# Patient Record
Sex: FEMALE | Race: WHITE | NOT HISPANIC OR LATINO | Employment: PART TIME | URBAN - METROPOLITAN AREA
[De-identification: names, ages, dates, MRNs, and addresses within clinical notes are randomized per-mention and may not be internally consistent; named-entity substitution may affect disease eponyms.]

---

## 2024-02-25 NOTE — PROGRESS NOTES
Subjective    Patient ID: Bharti Moran is a 73 y.o. female.    Chief Complaint: No chief complaint on file.     Last Surgery: No surgery found  Last Surgery Date: No surgery found    Bharti is a 73 y.o. right hand dominant female presenting today for evaluation of their right shoulder.     She explains that she is an avid exerciser, she used to do spin class 3 times a week. She began having back issues and went to her chiropractor who recommended she have an MRI completed of her spine. Her chiropractor advised she stopped spin class and yoga until her back issues are cleared up. She started lifting free weights and then she developed right shoulder pain in December 2023. It does not wake her up but it does cause her difficulty getting up. She uses Biofreeze to help with the pain. She takes no other medications     She is on Crestor and Zetia for heart disease. She does understand that statins do cause some musculoskeletal pain.     Past medical history, surgical history, social history, and family history were all reviewed and are as per the [facility] patient health history questionnaire form that I signed and scanned into the chart today.           Objective   Ortho Exam  Patient is a well-developed, well-nourished female in no acute distress.  Breathes with normal chest rises.  Pupils are round and symmetric today.  Awake, alert, and oriented x3.      Examination of the left shoulder today reveals the skin to be intact. There is no sign of any atrophy, lesions, or abrasions. There is no pain to palpation of the bony prominences. Cervical lymphadenopathy examined, and this was negative. Patient had 5 out of 5 wrist flexion, extension, and thumb extension bilaterally. Sensation was intact to light touch to median, ulnar, radial axillary, and musculocutaneous nerves bilaterally. Positive radial pulse bilaterally. Provocative maneuvers on the left side today were negative.  Range of motion of the left shoulder revealed  0-170° of forward elevation, 0-60° of external rotation, and internal rotation was to T-12.     Examination of the right shoulder today reveals the skin to be intact. There is no sign of any atrophy, lesions, or abrasions. There is no pain to palpation of the bony prominences. Cervical lymphadenopathy examined, and this was negative. Patient had 5 out of 5 wrist flexion, extension, and thumb extension, bilaterally. Sensation was intact to light touch to median, ulnar, radial, axillary, and musculocutaneous nerves bilaterally. Positive radial pulse bilaterally. Mildly positive Hawkin's sign.  Range of motion of the right shoulder revealed 0-170° of forward elevation, 0-60° of external rotation, and internal rotation up to T-12.     Image Results:  02/27/24 MRI personally reviewed by me today demonstrates disc desiccation. Mild hypertrophy and narrowing but nothing concerning    X-rays personally ordered and interpreted by me show no real arthritis, post surgical changes and anchors present from prior surgery.     Patient ID: Bharti Moran is a 73 y.o. female.    L Inj/Asp: L subacromial bursa on 2/27/2024 12:15 PM  Indications: pain  Details: 20 G needle, anterior approach  Outcome: tolerated well, no immediate complications  Procedure, treatment alternatives, risks and benefits explained, specific risks discussed. Consent was given by the patient. Immediately prior to procedure a time out was called to verify the correct patient, procedure, equipment, support staff and site/side marked as required. Patient was prepped and draped in the usual sterile fashion.           Assessment/Plan   Encounter Diagnoses:  No diagnosis found.  Patient with right shoulder pain.     We had a long discussion regarding her diagnosis. We talked about her treatment options. We are going to treat this conservatively at this time. We discussed modifying her activities such as less weight more reps, lower resistance while at spin class.  For her back focusing on more core strengthening will help.  I also provided her with our at home exercises to do daily. If she finds no relief she will let us know and we will obtain repeat imaging.   No orders of the defined types were placed in this encounter.    Follow up as needed.     Scribe Attestation  By signing my name below, I, Elvia Davis   attest that this documentation has been prepared under the direction and in the presence of Alo Rojas MD.

## 2024-02-27 ENCOUNTER — HOSPITAL ENCOUNTER (OUTPATIENT)
Dept: RADIOLOGY | Facility: HOSPITAL | Age: 74
Discharge: HOME | End: 2024-02-27
Payer: MEDICARE

## 2024-02-27 ENCOUNTER — OFFICE VISIT (OUTPATIENT)
Dept: ORTHOPEDIC SURGERY | Facility: HOSPITAL | Age: 74
End: 2024-02-27
Payer: MEDICARE

## 2024-02-27 DIAGNOSIS — M25.511 RIGHT SHOULDER PAIN, UNSPECIFIED CHRONICITY: ICD-10-CM

## 2024-02-27 PROCEDURE — 99213 OFFICE O/P EST LOW 20 MIN: CPT | Performed by: ORTHOPAEDIC SURGERY

## 2024-02-27 PROCEDURE — 73030 X-RAY EXAM OF SHOULDER: CPT | Mod: RIGHT SIDE | Performed by: RADIOLOGY

## 2024-02-27 PROCEDURE — 1159F MED LIST DOCD IN RCRD: CPT | Performed by: ORTHOPAEDIC SURGERY

## 2024-02-27 PROCEDURE — 73030 X-RAY EXAM OF SHOULDER: CPT | Mod: RT

## 2024-02-27 PROCEDURE — 20610 DRAIN/INJ JOINT/BURSA W/O US: CPT | Performed by: ORTHOPAEDIC SURGERY

## 2024-02-27 RX ORDER — EZETIMIBE 10 MG/1
TABLET ORAL
COMMUNITY
Start: 2023-12-11

## 2024-02-27 RX ORDER — ROSUVASTATIN CALCIUM 40 MG/1
TABLET, COATED ORAL
COMMUNITY
Start: 2024-02-19

## 2024-02-27 RX ORDER — ASPIRIN 81 MG/1
TABLET ORAL
COMMUNITY
Start: 2016-06-06

## 2024-05-05 NOTE — PROGRESS NOTES
Subjective    Patient ID: Bharti Moran is a 73 y.o. female.    Chief Complaint: No chief complaint on file.     Last Surgery: No surgery found  Last Surgery Date: No surgery found    HPI  Bharti is a 73 y.o. right hand dominant female presenting today for evaluation of their right shoulder.     She explains that she is an avid exerciser, she used to do spin class 3 times a week. She began having back issues and went to her chiropractor who recommended she have an MRI completed of her spine. Her chiropractor advised she stopped spin class and yoga until her back issues are cleared up. She started lifting free weights and then she developed right shoulder pain in December 2023. It does not wake her up but it does cause her difficulty getting up. She uses Biofreeze to help with the pain. She takes no other medications     She is on Crestor and Zetia for heart disease. She does understand that statins do cause some musculoskeletal pain.     05/07/24  Bharti returns to the clinic today for a follow up visit regarding her bilateral shoulders.     She reports that her right shoulder has improved. She continues to do her at-home exercises daily. They are very helpful    Past medical history, surgical history, social history, and family history were all reviewed and are as per the [facility] patient health history questionnaire form that I signed and scanned into the chart today.     Objective   Ortho Exam  Patient is a well-developed, well-nourished female in no acute distress.  Breathes with normal chest rises.  Pupils are round and symmetric today.  Awake, alert, and oriented x3.      Examination of the left shoulder today reveals the skin to be intact. There is no sign of any atrophy, lesions, or abrasions. There is no pain to palpation of the bony prominences. Cervical lymphadenopathy examined, and this was negative. Patient had 5 out of 5 wrist flexion, extension, and thumb extension bilaterally. Sensation was intact to  light touch to median, ulnar, radial axillary, and musculocutaneous nerves bilaterally. Positive radial pulse bilaterally. Provocative maneuvers on the left side today were negative.  Range of motion of the left shoulder revealed 0-170° of forward elevation, 0-60° of external rotation, and internal rotation was to T-12.     Examination of the right shoulder today reveals the skin to be intact. There is no sign of any atrophy, lesions, or abrasions. There is no pain to palpation of the bony prominences. Cervical lymphadenopathy examined, and this was negative. Patient had 5 out of 5 wrist flexion, extension, and thumb extension, bilaterally. Sensation was intact to light touch to median, ulnar, radial, axillary, and musculocutaneous nerves bilaterally. Positive radial pulse bilaterally. Mildly positive Hawkin's sign.  Range of motion of the right shoulder revealed 0-170° of forward elevation, 0-60° of external rotation, and internal rotation up to T-12.     Image Results:  02/27/24 MRI personally reviewed by me today demonstrates disc desiccation. Mild hypertrophy and narrowing but nothing concerning    X-rays personally ordered and interpreted by me show no real arthritis, post surgical changes and anchors present from prior surgery.         Assessment/Plan   Encounter Diagnoses:  No diagnosis found.  Patient with continued right shoulder pain.     She is going to continue with her activity modifications. She will continue with her at-home exercises. She will return if she continues to have increasing pain and finds no pain relief from her exercises.     No orders of the defined types were placed in this encounter.    Follow up as needed    Scribe Attestation  By signing my name below, IShira Scribe   attest that this documentation has been prepared under the direction and in the presence of Alo Rojas MD.

## 2024-05-07 ENCOUNTER — OFFICE VISIT (OUTPATIENT)
Dept: ORTHOPEDIC SURGERY | Facility: HOSPITAL | Age: 74
End: 2024-05-07
Payer: MEDICARE

## 2024-05-07 VITALS — HEIGHT: 64 IN | WEIGHT: 106 LBS | BODY MASS INDEX: 18.1 KG/M2

## 2024-05-07 DIAGNOSIS — M25.511 RIGHT SHOULDER PAIN, UNSPECIFIED CHRONICITY: Primary | ICD-10-CM

## 2024-05-07 PROCEDURE — 99213 OFFICE O/P EST LOW 20 MIN: CPT | Performed by: ORTHOPAEDIC SURGERY

## 2024-05-07 PROCEDURE — 1125F AMNT PAIN NOTED PAIN PRSNT: CPT | Performed by: ORTHOPAEDIC SURGERY

## 2024-05-07 PROCEDURE — 1159F MED LIST DOCD IN RCRD: CPT | Performed by: ORTHOPAEDIC SURGERY

## 2024-05-07 ASSESSMENT — PAIN SCALES - GENERAL: PAINLEVEL_OUTOF10: 1

## 2024-05-07 ASSESSMENT — PAIN - FUNCTIONAL ASSESSMENT: PAIN_FUNCTIONAL_ASSESSMENT: 0-10

## 2024-07-09 ENCOUNTER — OFFICE VISIT (OUTPATIENT)
Dept: ORTHOPEDIC SURGERY | Facility: HOSPITAL | Age: 74
End: 2024-07-09
Payer: MEDICARE

## 2024-07-09 DIAGNOSIS — M25.511 RIGHT SHOULDER PAIN, UNSPECIFIED CHRONICITY: Primary | ICD-10-CM

## 2024-07-09 PROCEDURE — 99213 OFFICE O/P EST LOW 20 MIN: CPT | Performed by: ORTHOPAEDIC SURGERY

## 2024-07-09 PROCEDURE — 2500000004 HC RX 250 GENERAL PHARMACY W/ HCPCS (ALT 636 FOR OP/ED): Performed by: ORTHOPAEDIC SURGERY

## 2024-07-09 PROCEDURE — 2500000005 HC RX 250 GENERAL PHARMACY W/O HCPCS: Performed by: ORTHOPAEDIC SURGERY

## 2024-07-09 PROCEDURE — 20610 DRAIN/INJ JOINT/BURSA W/O US: CPT | Mod: RT | Performed by: ORTHOPAEDIC SURGERY

## 2024-07-09 NOTE — PROGRESS NOTES
Subjective    Patient ID: Bharti Moran is a 73 y.o. female.    Chief Complaint: No chief complaint on file.     Last Surgery: No surgery found  Last Surgery Date: No surgery found    MARISA Hay is a 73 y.o. right hand dominant female presenting today for evaluation of their right shoulder.     She explains that she is an avid exerciser, she used to do spin class 3 times a week. She began having back issues and went to her chiropractor who recommended she have an MRI completed of her spine. Her chiropractor advised she stopped spin class and yoga until her back issues are cleared up. She started lifting free weights and then she developed right shoulder pain in December 2023. It does not wake her up but it does cause her difficulty getting up. She uses Biofreeze to help with the pain. She takes no other medications     She is on Crestor and Zetia for heart disease. She does understand that statins do cause some musculoskeletal pain.     05/07/24  Bharti returns to the clinic today for a follow up visit regarding her bilateral shoulders.     She reports that her right shoulder has improved. She continues to do her at-home exercises daily. They are very helpful    7/09/24   Bharti Moran is a 73 y.o. female returning to the clinic for a follow up visit regarding her right shoulder.    She explains she had a recent DEXA scan that she had questions regarding her injections and steroid use.     Past medical history, surgical history, social history, and family history were all reviewed and are as per the [facility] patient health history questionnaire form that I signed and scanned into the chart today.     Objective   Ortho Exam  Patient is a well-developed, well-nourished female in no acute distress.  Breathes with normal chest rises.  Pupils are round and symmetric today.  Awake, alert, and oriented x3.      Examination of the left shoulder today reveals the skin to be intact. There is no sign of any atrophy, lesions, or  abrasions. There is no pain to palpation of the bony prominences. Cervical lymphadenopathy examined, and this was negative. Patient had 5 out of 5 wrist flexion, extension, and thumb extension bilaterally. Sensation was intact to light touch to median, ulnar, radial axillary, and musculocutaneous nerves bilaterally. Positive radial pulse bilaterally. Provocative maneuvers on the left side today were negative.  Range of motion of the left shoulder revealed 0-170° of forward elevation, 0-60° of external rotation, and internal rotation was to T-12.     Examination of the right shoulder today reveals the skin to be intact. There is no sign of any atrophy, lesions, or abrasions. There is no pain to palpation of the bony prominences. Cervical lymphadenopathy examined, and this was negative. Patient had 5 out of 5 wrist flexion, extension, and thumb extension, bilaterally. Sensation was intact to light touch to median, ulnar, radial, axillary, and musculocutaneous nerves bilaterally. Positive radial pulse bilaterally. Mildly positive Hawkin's sign.  Range of motion of the right shoulder revealed 0-170° of forward elevation, 0-60° of external rotation, and internal rotation up to T-12.     Image Results:  02/27/24 MRI personally reviewed by me today demonstrates disc desiccation. Mild hypertrophy and narrowing but nothing concerning    X-rays personally ordered and interpreted by me show no real arthritis, post surgical changes and anchors present from prior surgery.     Patient ID: Bharti Moran is a 73 y.o. female.    L Inj/Asp: R subacromial bursa on 7/9/2024 1:39 PM  Indications: pain  Details: 20 G needle, anterior approach  Medications: 40 mg triamcinolone acetonide 40 mg/mL; 4 mL lidocaine 10 mg/mL (1 %)  Outcome: tolerated well, no immediate complications  Procedure, treatment alternatives, risks and benefits explained, specific risks discussed. Consent was given by the patient. Immediately prior to procedure a  time out was called to verify the correct patient, procedure, equipment, support staff and site/side marked as required. Patient was prepped and draped in the usual sterile fashion.           Assessment/Plan   Encounter Diagnoses:  No diagnosis found.  Patient with continued right shoulder pain.     We had a discussion regarding her concerns with long term steroid use.   She is going to continue with her activity modifications. She will continue with her at-home exercises. She will return if she continues to have increasing pain and finds no pain relief from her exercises.     No orders of the defined types were placed in this encounter.    Follow up as needed    Scribe Attestation  By signing my name below, Shira HUSTON , Scribcarolyne   attest that this documentation has been prepared under the direction and in the presence of Alo Rojas MD.

## 2024-07-11 RX ORDER — LIDOCAINE HYDROCHLORIDE 10 MG/ML
4 INJECTION INFILTRATION; PERINEURAL
Status: COMPLETED | OUTPATIENT
Start: 2024-07-09 | End: 2024-07-09

## 2024-07-11 RX ORDER — TRIAMCINOLONE ACETONIDE 40 MG/ML
40 INJECTION, SUSPENSION INTRA-ARTICULAR; INTRAMUSCULAR
Status: COMPLETED | OUTPATIENT
Start: 2024-07-09 | End: 2024-07-09

## 2024-10-22 ENCOUNTER — HOSPITAL ENCOUNTER (OUTPATIENT)
Dept: RADIOLOGY | Facility: HOSPITAL | Age: 74
Discharge: HOME | End: 2024-10-22
Payer: MEDICARE

## 2024-10-22 ENCOUNTER — OFFICE VISIT (OUTPATIENT)
Dept: ORTHOPEDIC SURGERY | Facility: HOSPITAL | Age: 74
End: 2024-10-22
Payer: MEDICARE

## 2024-10-22 ENCOUNTER — APPOINTMENT (OUTPATIENT)
Dept: ORTHOPEDIC SURGERY | Facility: HOSPITAL | Age: 74
End: 2024-10-22
Payer: MEDICARE

## 2024-10-22 DIAGNOSIS — M25.519 SHOULDER PAIN, UNSPECIFIED CHRONICITY, UNSPECIFIED LATERALITY: Primary | ICD-10-CM

## 2024-10-22 DIAGNOSIS — M25.519 SHOULDER PAIN, UNSPECIFIED CHRONICITY, UNSPECIFIED LATERALITY: ICD-10-CM

## 2024-10-22 PROCEDURE — 99213 OFFICE O/P EST LOW 20 MIN: CPT | Performed by: ORTHOPAEDIC SURGERY

## 2024-10-22 PROCEDURE — 20610 DRAIN/INJ JOINT/BURSA W/O US: CPT | Mod: RT | Performed by: ORTHOPAEDIC SURGERY

## 2024-10-22 PROCEDURE — 2500000004 HC RX 250 GENERAL PHARMACY W/ HCPCS (ALT 636 FOR OP/ED): Performed by: ORTHOPAEDIC SURGERY

## 2024-10-22 PROCEDURE — 73030 X-RAY EXAM OF SHOULDER: CPT | Mod: RIGHT SIDE | Performed by: RADIOLOGY

## 2024-10-22 PROCEDURE — 73030 X-RAY EXAM OF SHOULDER: CPT | Mod: RT

## 2024-10-22 NOTE — PROGRESS NOTES
Subjective    Patient ID: Bharti Moran is a 74 y.o. female.    Chief Complaint: Follow-up of the Right Shoulder     Last Surgery: No surgery found  Last Surgery Date: No surgery found    HPI  Bharti is a 73 y.o. right hand dominant female presenting today for evaluation of their right shoulder.     She explains that she is an avid exerciser, she used to do spin class 3 times a week. She began having back issues and went to her chiropractor who recommended she have an MRI completed of her spine. Her chiropractor advised she stopped spin class and yoga until her back issues are cleared up. She started lifting free weights and then she developed right shoulder pain in December 2023. It does not wake her up but it does cause her difficulty getting up. She uses Biofreeze to help with the pain. She takes no other medications     She is on Crestor and Zetia for heart disease. She does understand that statins do cause some musculoskeletal pain.     05/07/24  Bharti returns to the clinic today for a follow up visit regarding her bilateral shoulders.     She reports that her right shoulder has improved. She continues to do her at-home exercises daily. They are very helpful    07/09/24   Bharti Moran is a 74 y.o. female returning to the clinic for a follow up visit regarding her right shoulder.    She explains she had a recent DEXA scan that she had questions regarding her injections and steroid use.     10/22/24  Bharti Moran is a 74 y.o. female returning to the clinic for a follow up visit regarding her right shoulder.    She explains that her right shoulder has been hurting for a while. Then. 2 weeks ago she was at a wedding doing the jitterbug. The next day her shoulder was even more painful and she believed she exacerbated it while dancing.  She started doing her exercises twice a day and this resolved the pain somewhat    Past medical history, surgical history, social history, and family history were all reviewed and are  as per the [facility] patient health history questionnaire form that I signed and scanned into the chart today.     Objective   Ortho Exam  Patient is a well-developed, well-nourished female in no acute distress.  Breathes with normal chest rises.  Pupils are round and symmetric today.  Awake, alert, and oriented x3.      Examination of the left shoulder today reveals the skin to be intact. There is no sign of any atrophy, lesions, or abrasions. There is no pain to palpation of the bony prominences. Cervical lymphadenopathy examined, and this was negative. Patient had 5 out of 5 wrist flexion, extension, and thumb extension bilaterally. Sensation was intact to light touch to median, ulnar, radial axillary, and musculocutaneous nerves bilaterally. Positive radial pulse bilaterally. Provocative maneuvers on the left side today were negative.  Range of motion of the left shoulder revealed 0-170° of forward elevation, 0-60° of external rotation, and internal rotation was to T-12.     Examination of the right shoulder today reveals the skin to be intact. There is no sign of any atrophy, lesions, or abrasions. There is no pain to palpation of the bony prominences. Cervical lymphadenopathy examined, and this was negative. Patient had 5 out of 5 wrist flexion, extension, and thumb extension, bilaterally. Sensation was intact to light touch to median, ulnar, radial, axillary, and musculocutaneous nerves bilaterally. Positive radial pulse bilaterally. Mildly positive Hawkin's sign.  Range of motion of the right shoulder revealed 0-170° of forward elevation, 0-60° of external rotation, and internal rotation up to T-12.     Image Results:  02/27/24 MRI personally reviewed by me today demonstrates disc desiccation. Mild hypertrophy and narrowing but nothing concerning    X-rays personally ordered and interpreted by me show no real arthritis, post surgical changes and anchors present from prior surgery.     L Inj/Asp: R subacromial  bursa on 10/22/2024 4:11 PM  Indications: pain  Details: 20 G needle, anterior approach  Medications: 40 mg triamcinolone acetonide 40 mg/mL; 4 mL lidocaine 10 mg/mL (1 %)  Outcome: tolerated well, no immediate complications  Procedure, treatment alternatives, risks and benefits explained, specific risks discussed. Consent was given by the patient. Immediately prior to procedure a time out was called to verify the correct patient, procedure, equipment, support staff and site/side marked as required. Patient was prepped and draped in the usual sterile fashion.             Assessment/Plan   Encounter Diagnoses:  Shoulder pain, unspecified chronicity, unspecified laterality  Patient with right shoulder pain consistent with rotator cuff tendinitis     At this time, we had a very long discussion with the patient regarding the diagnosis. Rotator cuff disease is a spectrum of disorders ranging from rotator cuff tendinitis to full-thickness rotator cuff tears. We know that some patients over the age of 50 will have symptomatic rotator cuff tears just due to overuse and general wear and tear. In general, most patients who come in with complaints such as these will get better with therapy and injections alone. The therapy should be performed 2-3 times a day and should take about 10-15 minutes to perform each time.      We had a discussion regarding her concerns with long term steroid use.   She is going to continue with her activity modifications and home exercises. We did an anterior injection today. She understands that if she gets no relief we will have to obtain advanced imaging. Nothing on x-ray that is terribly concerning today.      Orders Placed This Encounter    XR shoulder right 2+ views     Follow up as needed    Scribe Attestation  By signing my name below, IShira , Elvia   attest that this documentation has been prepared under the direction and in the presence of Alo Rojas MD.

## 2024-10-28 RX ORDER — TRIAMCINOLONE ACETONIDE 40 MG/ML
40 INJECTION, SUSPENSION INTRA-ARTICULAR; INTRAMUSCULAR
Status: COMPLETED | OUTPATIENT
Start: 2024-10-22 | End: 2024-10-22

## 2024-10-28 RX ORDER — LIDOCAINE HYDROCHLORIDE 10 MG/ML
4 INJECTION, SOLUTION INFILTRATION; PERINEURAL
Status: COMPLETED | OUTPATIENT
Start: 2024-10-22 | End: 2024-10-22

## 2024-11-04 ENCOUNTER — APPOINTMENT (OUTPATIENT)
Dept: ORTHOPEDIC SURGERY | Facility: CLINIC | Age: 74
End: 2024-11-04
Payer: MEDICARE

## 2025-05-18 NOTE — PROGRESS NOTES
Subjective    Patient ID: Bharti Moran is a 74 y.o. female.    Chief Complaint: No chief complaint on file.     Last Surgery: No surgery found  Last Surgery Date: No surgery found    HPI  Bharti is a 73 y.o. right hand dominant female presenting today for evaluation of their right shoulder.     She explains that she is an avid exerciser, she used to do spin class 3 times a week. She began having back issues and went to her chiropractor who recommended she have an MRI completed of her spine. Her chiropractor advised she stopped spin class and yoga until her back issues are cleared up. She started lifting free weights and then she developed right shoulder pain in December 2023. It does not wake her up but it does cause her difficulty getting up. She uses Biofreeze to help with the pain. She takes no other medications     She is on Crestor and Zetia for heart disease. She does understand that statins do cause some musculoskeletal pain.     05/07/24  Bharti returns to the clinic today for a follow up visit regarding her bilateral shoulders.     She reports that her right shoulder has improved. She continues to do her at-home exercises daily. They are very helpful    07/09/24   Bharti Moran is a 74 y.o. female returning to the clinic for a follow up visit regarding her right shoulder.    She explains she had a recent DEXA scan that she had questions regarding her injections and steroid use.     10/22/24  Bharti Moran is a 74 y.o. female returning to the clinic for a follow up visit regarding her right shoulder.    She explains that her right shoulder has been hurting for a while. Then. 2 weeks ago she was at a wedding doing the jitterbug. The next day her shoulder was even more painful and she believed she exacerbated it while dancing.  She started doing her exercises twice a day and this resolved the pain somewhat    5/17/25  Bharti Moran is a 74 y.o. female returning to the clinic for a follow up visit regarding her  right shoulder.    She continues to stretch daily. When her injection is working, her shoulder feels great. She is concerned with what injections will do to her as she ages.     Past medical history, surgical history, social history, and family history were all reviewed and are as per the [facility] patient health history questionnaire form that I signed and scanned into the chart today.     Objective   Ortho Exam  Patient is a well-developed, well-nourished female in no acute distress.  Breathes with normal chest rises.  Pupils are round and symmetric today.  Awake, alert, and oriented x3.      Examination of the left shoulder today reveals the skin to be intact. There is no sign of any atrophy, lesions, or abrasions. There is no pain to palpation of the bony prominences. Cervical lymphadenopathy examined, and this was negative. Patient had 5 out of 5 wrist flexion, extension, and thumb extension bilaterally. Sensation was intact to light touch to median, ulnar, radial axillary, and musculocutaneous nerves bilaterally. Positive radial pulse bilaterally. Provocative maneuvers on the left side today were negative.  Range of motion of the left shoulder revealed 0-170° of forward elevation, 0-60° of external rotation, and internal rotation was to T-12.     Examination of the right shoulder today reveals the skin to be intact. There is no sign of any atrophy, lesions, or abrasions. There is no pain to palpation of the bony prominences. Cervical lymphadenopathy examined, and this was negative. Patient had 5 out of 5 wrist flexion, extension, and thumb extension, bilaterally. Sensation was intact to light touch to median, ulnar, radial, axillary, and musculocutaneous nerves bilaterally. Positive radial pulse bilaterally. Mildly positive Hawkin's sign.  Range of motion of the right shoulder revealed 0-170° of forward elevation, 0-60° of external rotation, and internal rotation up to T-12.     Image Results:  02/27/24 MRI  personally reviewed by me today demonstrates disc desiccation. Mild hypertrophy and narrowing but nothing concerning    X-rays personally ordered and interpreted by me show no real arthritis, post surgical changes and anchors present from prior surgery.     L Inj/Asp: R subacromial bursa on 5/19/2025 2:01 PM  Indications: pain  Details: 20 G needle, anterior approach  Medications: 40 mg triamcinolone acetonide 40 mg/mL; 4 mL lidocaine 10 mg/mL (1 %)  Outcome: tolerated well, no immediate complications  Procedure, treatment alternatives, risks and benefits explained, specific risks discussed. Consent was given by the patient. Immediately prior to procedure a time out was called to verify the correct patient, procedure, equipment, support staff and site/side marked as required. Patient was prepped and draped in the usual sterile fashion.             Assessment/Plan   Encounter Diagnoses:  No diagnosis found.  Patient with right shoulder pain consistent with rotator cuff tendinitis     At this time, we had a very long discussion with the patient regarding the diagnosis. Rotator cuff disease is a spectrum of disorders ranging from rotator cuff tendinitis to full-thickness rotator cuff tears. We know that some patients over the age of 50 will have symptomatic rotator cuff tears just due to overuse and general wear and tear. In general, most patients who come in with complaints such as these will get better with therapy and injections alone. The therapy should be performed 2-3 times a day and should take about 10-15 minutes to perform each time.      She tolerated the injection well today in RIGHT shoulder. We will see how they do after the injection. They are aware that there can be a spike in glucose levels following an injection.    If they do not get sustained relief from the injection, consideration for a repeat injection versus advanced imaging of an MRI will be warranted as then they would have failed over 6 weeks  of conservative management of injections, therapy, rest and over-the-counter pain medications.      No orders of the defined types were placed in this encounter.    Follow up as needed    Scribe Attestation  By signing my name below, I, Lakia Davisibcarolyne   attest that this documentation has been prepared under the direction and in the presence of Alo Rojas MD.   no

## 2025-05-19 ENCOUNTER — APPOINTMENT (OUTPATIENT)
Dept: ORTHOPEDIC SURGERY | Facility: CLINIC | Age: 75
End: 2025-05-19
Payer: MEDICARE

## 2025-05-19 DIAGNOSIS — M25.511 RIGHT SHOULDER PAIN, UNSPECIFIED CHRONICITY: Primary | ICD-10-CM

## 2025-05-19 PROCEDURE — 99213 OFFICE O/P EST LOW 20 MIN: CPT | Performed by: ORTHOPAEDIC SURGERY

## 2025-05-19 PROCEDURE — 20610 DRAIN/INJ JOINT/BURSA W/O US: CPT | Performed by: ORTHOPAEDIC SURGERY

## 2025-05-19 RX ADMIN — TRIAMCINOLONE ACETONIDE 40 MG: 40 INJECTION, SUSPENSION INTRA-ARTICULAR; INTRAMUSCULAR at 14:01

## 2025-05-19 RX ADMIN — LIDOCAINE HYDROCHLORIDE 4 ML: 10 INJECTION, SOLUTION INFILTRATION; PERINEURAL at 14:01

## 2025-05-22 RX ORDER — LIDOCAINE HYDROCHLORIDE 10 MG/ML
4 INJECTION, SOLUTION INFILTRATION; PERINEURAL
Status: COMPLETED | OUTPATIENT
Start: 2025-05-19 | End: 2025-05-19

## 2025-05-22 RX ORDER — TRIAMCINOLONE ACETONIDE 40 MG/ML
40 INJECTION, SUSPENSION INTRA-ARTICULAR; INTRAMUSCULAR
Status: COMPLETED | OUTPATIENT
Start: 2025-05-19 | End: 2025-05-19